# Patient Record
Sex: FEMALE | Race: OTHER | Employment: UNEMPLOYED | URBAN - METROPOLITAN AREA
[De-identification: names, ages, dates, MRNs, and addresses within clinical notes are randomized per-mention and may not be internally consistent; named-entity substitution may affect disease eponyms.]

---

## 2018-10-15 ENCOUNTER — HOSPITAL ENCOUNTER (OUTPATIENT)
Dept: ULTRASOUND IMAGING | Age: 38
Discharge: HOME | End: 2018-10-15
Attending: OBSTETRICS & GYNECOLOGY
Payer: COMMERCIAL

## 2018-10-15 DIAGNOSIS — D25.1 INTRAMURAL LEIOMYOMA OF UTERUS: ICD-10-CM

## 2018-10-15 PROCEDURE — 76856 US EXAM PELVIC COMPLETE: CPT | Performed by: OBSTETRICS & GYNECOLOGY

## 2018-10-15 PROCEDURE — 76830 TRANSVAGINAL US NON-OB: CPT | Performed by: OBSTETRICS & GYNECOLOGY

## 2018-10-22 NOTE — PROGRESS NOTES
Spoke with pt regarding US results. Pt aware of need for [facility] with probable polypectomy. Pt was told that [facility] will call to schedule procedure within next few days.

## 2018-11-28 ENCOUNTER — ANESTHESIA EVENT (OUTPATIENT)
Dept: SURGERY | Facility: HOSPITAL | Age: 38
End: 2018-11-28
Payer: COMMERCIAL

## 2018-11-29 ENCOUNTER — ANESTHESIA (OUTPATIENT)
Dept: SURGERY | Facility: HOSPITAL | Age: 38
End: 2018-11-29
Payer: COMMERCIAL

## 2018-11-29 ENCOUNTER — HOSPITAL ENCOUNTER (OUTPATIENT)
Facility: HOSPITAL | Age: 38
Setting detail: HOSPITAL OUTPATIENT SURGERY
Discharge: HOME | End: 2018-11-29
Attending: OBSTETRICS & GYNECOLOGY | Admitting: OBSTETRICS & GYNECOLOGY
Payer: COMMERCIAL

## 2018-11-29 VITALS
HEART RATE: 66 BPM | BODY MASS INDEX: 29.02 KG/M2 | HEIGHT: 65 IN | SYSTOLIC BLOOD PRESSURE: 106 MMHG | RESPIRATION RATE: 18 BRPM | DIASTOLIC BLOOD PRESSURE: 78 MMHG | TEMPERATURE: 98 F | WEIGHT: 174.19 LBS | OXYGEN SATURATION: 98 %

## 2018-11-29 DIAGNOSIS — N85.8 UTERINE MASS: ICD-10-CM

## 2018-11-29 PROCEDURE — 88305 TISSUE EXAM BY PATHOLOGIST: CPT | Performed by: OBSTETRICS & GYNECOLOGY

## 2018-11-29 PROCEDURE — 0UDB7ZZ EXTRACTION OF ENDOMETRIUM, VIA NATURAL OR ARTIFICIAL OPENING: ICD-10-PCS | Performed by: OBSTETRICS & GYNECOLOGY

## 2018-11-29 PROCEDURE — 0UB98ZZ EXCISION OF UTERUS, VIA NATURAL OR ARTIFICIAL OPENING ENDOSCOPIC: ICD-10-PCS | Performed by: OBSTETRICS & GYNECOLOGY

## 2018-11-29 RX ORDER — ACETAMINOPHEN 500 MG
500 TABLET ORAL EVERY 6 HOURS PRN
Status: ON HOLD | COMMUNITY
End: 2019-08-15

## 2018-11-29 RX ORDER — HYDROCODONE BITARTRATE AND ACETAMINOPHEN 5; 325 MG/1; MG/1
1 TABLET ORAL AS NEEDED
Status: DISCONTINUED | OUTPATIENT
Start: 2018-11-29 | End: 2018-11-29

## 2018-11-29 RX ORDER — METOCLOPRAMIDE HYDROCHLORIDE 5 MG/ML
10 INJECTION INTRAMUSCULAR; INTRAVENOUS AS NEEDED
Status: DISCONTINUED | OUTPATIENT
Start: 2018-11-29 | End: 2018-11-29

## 2018-11-29 RX ORDER — ONDANSETRON 4 MG/1
4 TABLET, FILM COATED ORAL EVERY 8 HOURS PRN
Status: DISCONTINUED | OUTPATIENT
Start: 2018-11-29 | End: 2018-11-29

## 2018-11-29 RX ORDER — ACETAMINOPHEN 500 MG
1000 TABLET ORAL ONCE
Status: DISCONTINUED | OUTPATIENT
Start: 2018-11-29 | End: 2018-11-29

## 2018-11-29 RX ORDER — SODIUM CHLORIDE, SODIUM LACTATE, POTASSIUM CHLORIDE, CALCIUM CHLORIDE 600; 310; 30; 20 MG/100ML; MG/100ML; MG/100ML; MG/100ML
INJECTION, SOLUTION INTRAVENOUS CONTINUOUS
Status: DISCONTINUED | OUTPATIENT
Start: 2018-11-29 | End: 2018-11-29

## 2018-11-29 RX ORDER — NALOXONE HYDROCHLORIDE 0.4 MG/ML
80 INJECTION, SOLUTION INTRAMUSCULAR; INTRAVENOUS; SUBCUTANEOUS AS NEEDED
Status: DISCONTINUED | OUTPATIENT
Start: 2018-11-29 | End: 2018-11-29

## 2018-11-29 RX ORDER — HYDROMORPHONE HYDROCHLORIDE 1 MG/ML
0.4 INJECTION, SOLUTION INTRAMUSCULAR; INTRAVENOUS; SUBCUTANEOUS EVERY 5 MIN PRN
Status: DISCONTINUED | OUTPATIENT
Start: 2018-11-29 | End: 2018-11-29

## 2018-11-29 RX ORDER — ONDANSETRON 2 MG/ML
4 INJECTION INTRAMUSCULAR; INTRAVENOUS EVERY 8 HOURS PRN
Status: DISCONTINUED | OUTPATIENT
Start: 2018-11-29 | End: 2018-11-29

## 2018-11-29 RX ORDER — HYDROCODONE BITARTRATE AND ACETAMINOPHEN 5; 325 MG/1; MG/1
2 TABLET ORAL AS NEEDED
Status: DISCONTINUED | OUTPATIENT
Start: 2018-11-29 | End: 2018-11-29

## 2018-11-29 RX ORDER — ONDANSETRON 2 MG/ML
4 INJECTION INTRAMUSCULAR; INTRAVENOUS AS NEEDED
Status: DISCONTINUED | OUTPATIENT
Start: 2018-11-29 | End: 2018-11-29

## 2018-11-29 NOTE — ANESTHESIA PREPROCEDURE EVALUATION
PRE-OP EVALUATION    Patient Name: Estevan Ortega    Pre-op Diagnosis: Uterine mass [N85.9]    Procedure(s): HYSTEROSCOPY POSSIBLE POLYPECTOMY OR MYOMECTOMY    Surgeon(s) and Role:     * Anais Newsome MD - Primary    Pre-op vitals reviewed.         B

## 2018-11-29 NOTE — ANESTHESIA POSTPROCEDURE EVALUATION
[facility] Status:  [facility]   Age/Gender 43year old female MRN [id number]   [facility] SURGERY Attending Zo Newsome MD   Hosp Day # 0 PCP No primary care provider on file.        Anes

## 2018-11-29 NOTE — OPERATIVE REPORT
Operative Note    Preop diagnosis: Menorrhagia      Anemia     Endometrial polyps  Postop diagnosis: Same  Procedure:  Hysteroscopy D&C polypectomy  Surgeon:  Milana Morillo  Anesthesia:  MAC  Findings:  HSC: multiple endometrial polyps, NL tubal ostia bilaterally

## 2019-05-14 PROBLEM — M23.52 OLD COMPLETE ACL TEAR, LEFT: Status: ACTIVE | Noted: 2019-05-14

## 2019-05-14 PROBLEM — S83.242A ACUTE MEDIAL MENISCUS TEAR OF LEFT KNEE: Status: ACTIVE | Noted: 2019-05-14

## 2019-05-14 PROBLEM — S83.282A ACUTE LATERAL MENISCUS TEAR OF LEFT KNEE: Status: ACTIVE | Noted: 2019-05-14

## 2019-05-14 PROBLEM — S83.422A TEAR OF LATERAL COLLATERAL LIGAMENT OF LEFT KNEE: Status: ACTIVE | Noted: 2019-05-14

## 2019-08-15 ENCOUNTER — APPOINTMENT (OUTPATIENT)
Dept: GENERAL RADIOLOGY | Facility: HOSPITAL | Age: 39
End: 2019-08-15
Attending: ORTHOPAEDIC SURGERY
Payer: COMMERCIAL

## 2019-08-15 ENCOUNTER — HOSPITAL ENCOUNTER (OUTPATIENT)
Facility: HOSPITAL | Age: 39
Discharge: HOME | End: 2019-08-16
Attending: ORTHOPAEDIC SURGERY | Admitting: ORTHOPAEDIC SURGERY
Payer: COMMERCIAL

## 2019-08-15 LAB
HBV SURFACE AG SER-ACNC: <0.1 [IU]/L
HBV SURFACE AG SERPL QL IA: NONREACTIVE
HCV AB SERPL QL IA: NONREACTIVE
POCT LOT NUMBER: NORMAL
POCT URINE PREGNANCY: NEGATIVE
RAPID HIV: NONREACTIVE

## 2019-08-15 PROCEDURE — 01NH0ZZ RELEASE PERONEAL NERVE, OPEN APPROACH: ICD-10-PCS | Performed by: ORTHOPAEDIC SURGERY

## 2019-08-15 PROCEDURE — 76000 FLUOROSCOPY <1 HR PHYS/QHP: CPT | Performed by: ORTHOPAEDIC SURGERY

## 2019-08-15 PROCEDURE — 81025 URINE PREGNANCY TEST: CPT | Performed by: ORTHOPAEDIC SURGERY

## 2019-08-15 PROCEDURE — 0MRN0KZ REPLACEMENT OF RIGHT KNEE BURSA AND LIGAMENT WITH NONAUTOLOGOUS TISSUE SUBSTITUTE, OPEN APPROACH: ICD-10-PCS | Performed by: ORTHOPAEDIC SURGERY

## 2019-08-15 PROCEDURE — 0SBC4ZZ EXCISION OF RIGHT KNEE JOINT, PERCUTANEOUS ENDOSCOPIC APPROACH: ICD-10-PCS | Performed by: ORTHOPAEDIC SURGERY

## 2019-08-15 PROCEDURE — 0LRQ0KZ REPLACEMENT OF RIGHT KNEE TENDON WITH NONAUTOLOGOUS TISSUE SUBSTITUTE, OPEN APPROACH: ICD-10-PCS | Performed by: ORTHOPAEDIC SURGERY

## 2019-08-15 PROCEDURE — 86701 HIV-1ANTIBODY: CPT | Performed by: ORTHOPAEDIC SURGERY

## 2019-08-15 PROCEDURE — 87340 HEPATITIS B SURFACE AG IA: CPT | Performed by: ORTHOPAEDIC SURGERY

## 2019-08-15 PROCEDURE — 86803 HEPATITIS C AB TEST: CPT | Performed by: ORTHOPAEDIC SURGERY

## 2019-08-15 RX ORDER — CEFAZOLIN SODIUM/WATER 2 G/20 ML
2 SYRINGE (ML) INTRAVENOUS ONCE
Status: COMPLETED | OUTPATIENT
Start: 2019-08-15 | End: 2019-08-15

## 2019-08-15 RX ORDER — ONDANSETRON 2 MG/ML
4 INJECTION INTRAMUSCULAR; INTRAVENOUS AS NEEDED
Status: DISCONTINUED | OUTPATIENT
Start: 2019-08-15 | End: 2019-08-15 | Stop reason: HOSPADM

## 2019-08-15 RX ORDER — SODIUM PHOSPHATE, DIBASIC AND SODIUM PHOSPHATE, MONOBASIC 7; 19 G/133ML; G/133ML
1 ENEMA RECTAL ONCE AS NEEDED
Status: DISCONTINUED | OUTPATIENT
Start: 2019-08-15 | End: 2019-08-16

## 2019-08-15 RX ORDER — HYDROMORPHONE HYDROCHLORIDE 1 MG/ML
0.4 INJECTION, SOLUTION INTRAMUSCULAR; INTRAVENOUS; SUBCUTANEOUS EVERY 5 MIN PRN
Status: DISCONTINUED | OUTPATIENT
Start: 2019-08-15 | End: 2019-08-15 | Stop reason: HOSPADM

## 2019-08-15 RX ORDER — DIPHENHYDRAMINE HYDROCHLORIDE 50 MG/ML
12.5 INJECTION INTRAMUSCULAR; INTRAVENOUS EVERY 4 HOURS PRN
Status: DISCONTINUED | OUTPATIENT
Start: 2019-08-15 | End: 2019-08-16

## 2019-08-15 RX ORDER — SODIUM CHLORIDE, SODIUM LACTATE, POTASSIUM CHLORIDE, CALCIUM CHLORIDE 600; 310; 30; 20 MG/100ML; MG/100ML; MG/100ML; MG/100ML
INJECTION, SOLUTION INTRAVENOUS CONTINUOUS
Status: DISCONTINUED | OUTPATIENT
Start: 2019-08-15 | End: 2019-08-16

## 2019-08-15 RX ORDER — ASPIRIN 325 MG
325 TABLET ORAL 2 TIMES DAILY
Status: DISCONTINUED | OUTPATIENT
Start: 2019-08-15 | End: 2019-08-16

## 2019-08-15 RX ORDER — OXYCODONE HYDROCHLORIDE 10 MG/1
10 TABLET ORAL EVERY 4 HOURS PRN
Status: DISCONTINUED | OUTPATIENT
Start: 2019-08-15 | End: 2019-08-16

## 2019-08-15 RX ORDER — OXYCODONE HCL 10 MG/1
10 TABLET, FILM COATED, EXTENDED RELEASE ORAL EVERY 12 HOURS
Qty: 10 TABLET | Refills: 0 | Status: SHIPPED | OUTPATIENT
Start: 2019-08-15 | End: 2019-09-24 | Stop reason: ALTCHOICE

## 2019-08-15 RX ORDER — ACETAMINOPHEN 325 MG/1
650 TABLET ORAL 4 TIMES DAILY
Status: DISCONTINUED | OUTPATIENT
Start: 2019-08-15 | End: 2019-08-16

## 2019-08-15 RX ORDER — SODIUM CHLORIDE, SODIUM LACTATE, POTASSIUM CHLORIDE, CALCIUM CHLORIDE 600; 310; 30; 20 MG/100ML; MG/100ML; MG/100ML; MG/100ML
INJECTION, SOLUTION INTRAVENOUS CONTINUOUS
Status: DISCONTINUED | OUTPATIENT
Start: 2019-08-15 | End: 2019-08-15 | Stop reason: HOSPADM

## 2019-08-15 RX ORDER — MIDAZOLAM HYDROCHLORIDE 1 MG/ML
1 INJECTION INTRAMUSCULAR; INTRAVENOUS EVERY 5 MIN PRN
Status: DISCONTINUED | OUTPATIENT
Start: 2019-08-15 | End: 2019-08-15 | Stop reason: HOSPADM

## 2019-08-15 RX ORDER — POLYETHYLENE GLYCOL 3350 17 G/17G
17 POWDER, FOR SOLUTION ORAL DAILY PRN
Status: DISCONTINUED | OUTPATIENT
Start: 2019-08-15 | End: 2019-08-16

## 2019-08-15 RX ORDER — HYDROMORPHONE HYDROCHLORIDE 1 MG/ML
0.8 INJECTION, SOLUTION INTRAMUSCULAR; INTRAVENOUS; SUBCUTANEOUS EVERY 2 HOUR PRN
Status: DISCONTINUED | OUTPATIENT
Start: 2019-08-15 | End: 2019-08-16

## 2019-08-15 RX ORDER — LABETALOL HYDROCHLORIDE 5 MG/ML
5 INJECTION, SOLUTION INTRAVENOUS EVERY 5 MIN PRN
Status: DISCONTINUED | OUTPATIENT
Start: 2019-08-15 | End: 2019-08-15 | Stop reason: HOSPADM

## 2019-08-15 RX ORDER — HYDROCODONE BITARTRATE AND ACETAMINOPHEN 5; 325 MG/1; MG/1
1 TABLET ORAL AS NEEDED
Status: DISCONTINUED | OUTPATIENT
Start: 2019-08-15 | End: 2019-08-15 | Stop reason: HOSPADM

## 2019-08-15 RX ORDER — SENNOSIDES 8.6 MG
17.2 TABLET ORAL NIGHTLY
Status: DISCONTINUED | OUTPATIENT
Start: 2019-08-15 | End: 2019-08-16

## 2019-08-15 RX ORDER — PROCHLORPERAZINE EDISYLATE 5 MG/ML
10 INJECTION INTRAMUSCULAR; INTRAVENOUS EVERY 6 HOURS PRN
Status: DISCONTINUED | OUTPATIENT
Start: 2019-08-15 | End: 2019-08-16

## 2019-08-15 RX ORDER — ACETAMINOPHEN 500 MG
1000 TABLET ORAL ONCE
Status: DISCONTINUED | OUTPATIENT
Start: 2019-08-15 | End: 2019-08-15 | Stop reason: HOSPADM

## 2019-08-15 RX ORDER — METOCLOPRAMIDE HYDROCHLORIDE 5 MG/ML
10 INJECTION INTRAMUSCULAR; INTRAVENOUS EVERY 6 HOURS PRN
Status: DISCONTINUED | OUTPATIENT
Start: 2019-08-15 | End: 2019-08-16

## 2019-08-15 RX ORDER — DIPHENHYDRAMINE HCL 25 MG
25 CAPSULE ORAL EVERY 4 HOURS PRN
Status: DISCONTINUED | OUTPATIENT
Start: 2019-08-15 | End: 2019-08-16

## 2019-08-15 RX ORDER — ONDANSETRON 2 MG/ML
4 INJECTION INTRAMUSCULAR; INTRAVENOUS EVERY 4 HOURS PRN
Status: DISCONTINUED | OUTPATIENT
Start: 2019-08-15 | End: 2019-08-16

## 2019-08-15 RX ORDER — HYDROCODONE BITARTRATE AND ACETAMINOPHEN 5; 325 MG/1; MG/1
2 TABLET ORAL AS NEEDED
Status: DISCONTINUED | OUTPATIENT
Start: 2019-08-15 | End: 2019-08-15 | Stop reason: HOSPADM

## 2019-08-15 RX ORDER — SCOLOPAMINE TRANSDERMAL SYSTEM 1 MG/1
1 PATCH, EXTENDED RELEASE TRANSDERMAL ONCE
Status: DISCONTINUED | OUTPATIENT
Start: 2019-08-15 | End: 2019-08-16

## 2019-08-15 RX ORDER — HYDROMORPHONE HYDROCHLORIDE 1 MG/ML
INJECTION, SOLUTION INTRAMUSCULAR; INTRAVENOUS; SUBCUTANEOUS
Status: COMPLETED
Start: 2019-08-15 | End: 2019-08-15

## 2019-08-15 RX ORDER — BISACODYL 10 MG
10 SUPPOSITORY, RECTAL RECTAL
Status: DISCONTINUED | OUTPATIENT
Start: 2019-08-15 | End: 2019-08-16

## 2019-08-15 RX ORDER — CEFAZOLIN SODIUM/WATER 2 G/20 ML
2 SYRINGE (ML) INTRAVENOUS EVERY 8 HOURS
Status: COMPLETED | OUTPATIENT
Start: 2019-08-16 | End: 2019-08-16

## 2019-08-15 RX ORDER — HYDROMORPHONE HYDROCHLORIDE 1 MG/ML
0.2 INJECTION, SOLUTION INTRAMUSCULAR; INTRAVENOUS; SUBCUTANEOUS EVERY 2 HOUR PRN
Status: DISCONTINUED | OUTPATIENT
Start: 2019-08-15 | End: 2019-08-16

## 2019-08-15 RX ORDER — MELATONIN
325
Status: DISCONTINUED | OUTPATIENT
Start: 2019-08-16 | End: 2019-08-16

## 2019-08-15 RX ORDER — DIPHENHYDRAMINE HYDROCHLORIDE 50 MG/ML
25 INJECTION INTRAMUSCULAR; INTRAVENOUS ONCE AS NEEDED
Status: ACTIVE | OUTPATIENT
Start: 2019-08-15 | End: 2019-08-15

## 2019-08-15 RX ORDER — NALOXONE HYDROCHLORIDE 0.4 MG/ML
80 INJECTION, SOLUTION INTRAMUSCULAR; INTRAVENOUS; SUBCUTANEOUS AS NEEDED
Status: DISCONTINUED | OUTPATIENT
Start: 2019-08-15 | End: 2019-08-15 | Stop reason: HOSPADM

## 2019-08-15 RX ORDER — DOCUSATE SODIUM 100 MG/1
100 CAPSULE, LIQUID FILLED ORAL 2 TIMES DAILY
Status: DISCONTINUED | OUTPATIENT
Start: 2019-08-15 | End: 2019-08-16

## 2019-08-15 RX ORDER — DIPHENHYDRAMINE HYDROCHLORIDE 50 MG/ML
12.5 INJECTION INTRAMUSCULAR; INTRAVENOUS AS NEEDED
Status: DISCONTINUED | OUTPATIENT
Start: 2019-08-15 | End: 2019-08-15 | Stop reason: HOSPADM

## 2019-08-15 RX ORDER — HYDROMORPHONE HYDROCHLORIDE 1 MG/ML
0.4 INJECTION, SOLUTION INTRAMUSCULAR; INTRAVENOUS; SUBCUTANEOUS EVERY 2 HOUR PRN
Status: DISCONTINUED | OUTPATIENT
Start: 2019-08-15 | End: 2019-08-16

## 2019-08-15 RX ORDER — OXYCODONE HYDROCHLORIDE 5 MG/1
5 TABLET ORAL EVERY 4 HOURS PRN
Status: DISCONTINUED | OUTPATIENT
Start: 2019-08-15 | End: 2019-08-16

## 2019-08-15 RX ORDER — MEPERIDINE HYDROCHLORIDE 25 MG/ML
12.5 INJECTION INTRAMUSCULAR; INTRAVENOUS; SUBCUTANEOUS AS NEEDED
Status: DISCONTINUED | OUTPATIENT
Start: 2019-08-15 | End: 2019-08-15 | Stop reason: HOSPADM

## 2019-08-15 RX ORDER — HYDROCODONE BITARTRATE AND ACETAMINOPHEN 10; 325 MG/1; MG/1
1 TABLET ORAL EVERY 4 HOURS PRN
Qty: 40 TABLET | Refills: 0 | Status: SHIPPED | OUTPATIENT
Start: 2019-08-15 | End: 2019-08-20

## 2019-08-15 RX ORDER — OXYCODONE HYDROCHLORIDE 15 MG/1
15 TABLET ORAL EVERY 4 HOURS PRN
Status: DISCONTINUED | OUTPATIENT
Start: 2019-08-15 | End: 2019-08-16

## 2019-08-15 NOTE — H&P
History & Physical Examination    Patient Name: Kiersten Stanton  MRN: [id number]  CSN: [id number]  YOB: 1980    Diagnosis: Right knee ACL tear and posterolateral corner injury    Present Illness: Ms. Heena Green is a 34-year-old female here for s chondral injuries are noted within the knee.     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   Her HPI.        PHYSICAL EXAM:  General: Martha Hussein is well-developed, alert and oriented x3, and in no acute distress, with a normal affect. Head: Normocephalic, atraumatic. Eyes: Pupils are equal and reactive to light. Extraocular muscles are intact.    Ches arthroscopy, partial medial and lateral meniscectomy, ACL reconstruction and posterolateral corner reconstruction with allograft. This would likely be done in conjunction with Dr. Adam Dover.   The patient is agreeable with the treatment plan and all question have discussed the risks and benefits and alternatives with the patient/family. They understand and agree to proceed with plan of care. [ x ] I have reviewed the History and Physical done within the last 30 days. Any changes noted above.     Asha Camilo

## 2019-08-16 VITALS
DIASTOLIC BLOOD PRESSURE: 65 MMHG | TEMPERATURE: 98 F | RESPIRATION RATE: 16 BRPM | OXYGEN SATURATION: 98 % | SYSTOLIC BLOOD PRESSURE: 97 MMHG | BODY MASS INDEX: 34.88 KG/M2 | HEIGHT: 61 IN | HEART RATE: 57 BPM | WEIGHT: 184.75 LBS

## 2019-08-16 PROCEDURE — 97165 OT EVAL LOW COMPLEX 30 MIN: CPT

## 2019-08-16 PROCEDURE — 97161 PT EVAL LOW COMPLEX 20 MIN: CPT

## 2019-08-16 PROCEDURE — 97535 SELF CARE MNGMENT TRAINING: CPT

## 2019-08-16 PROCEDURE — 97116 GAIT TRAINING THERAPY: CPT

## 2019-08-16 RX ORDER — HYDROCODONE BITARTRATE AND ACETAMINOPHEN 10; 325 MG/1; MG/1
1 TABLET ORAL EVERY 4 HOURS PRN
Status: DISCONTINUED | OUTPATIENT
Start: 2019-08-16 | End: 2019-08-16

## 2019-08-16 RX ORDER — HYDROCODONE BITARTRATE AND ACETAMINOPHEN 10; 325 MG/1; MG/1
2 TABLET ORAL EVERY 4 HOURS PRN
Status: DISCONTINUED | OUTPATIENT
Start: 2019-08-16 | End: 2019-08-16

## 2019-08-16 RX ORDER — ASPIRIN 325 MG
325 TABLET ORAL DAILY
Qty: 42 TABLET | Refills: 0 | Status: SHIPPED | OUTPATIENT
Start: 2019-08-16 | End: 2019-09-27

## 2019-08-16 RX ORDER — ASPIRIN 325 MG
325 TABLET ORAL 2 TIMES DAILY
Qty: 42 TABLET | Refills: 0 | Status: SHIPPED | OUTPATIENT
Start: 2019-08-16 | End: 2019-08-16

## 2019-08-16 NOTE — PLAN OF CARE
Pt A & O x3, Filipino speaking,  at bedside. L scop patch. Denies numbness/tingling. Regular diet. TTWB. Up  x sba/walker. TROM brace. Ace wrap CDI. OXYIR PRN pain. Miralax given this AM. ASA 325mg BID. PT/OT.  Dr. Vamshi Briggs called pt's  this AM

## 2019-08-16 NOTE — PROGRESS NOTES
She says she feels well.   Pain controlled on [place].    Patient Vitals for the past 24 hrs:   BP Temp Temp src Pulse Resp SpO2   08/16/19 1554 97/65 98.4 °F (36.9 °C) Oral 57 16 —   08/16/19 1156 104/61 98.3 °F (36.8 °C) Oral 67 16 98 %   08/16/19 0759 99/6

## 2019-08-16 NOTE — PHYSICAL THERAPY NOTE
PHYSICAL THERAPY QUICK EVALUATION - INPATIENT    Room Number: 359/359-A  Evaluation Date: 8/16/2019  Presenting Problem: s/p right ACL repair, partial menisectomy,neurolysis of peroneal nerve  Physician Order: PT Eval and Treat    Problem List  Active Pr AM-PAC '6-Clicks' INPATIENT SHORT FORM - BASIC MOBILITY  How much difficulty does the patient currently have. ..  -   Turning over in bed (including adjusting bedclothes, sheets and blankets)?: None   -   Sitting down on and standing up from a chair w education provided; All patient questions and concerns addressed;SCDs in place; Family present    ASSESSMENT   Patient is a 43year old female admitted on 8/15/2019 for right ACL repair, partial lateral menisectomy, neurolysis of peroneal nerve.   Pertinent c

## 2019-08-16 NOTE — OPERATIVE REPORT
[facility]    PATIENT'S NAME: Temi Matute PHYSICIAN: Greta Aguirre M.D. OPERATING PHYSICIAN: Greta Aguirre M.D.    PATIENT ACCOUNT#:   [de-identified]    LOCATION:  [address] [place] [address]  MEDICAL RECORD #:   [id number]       DATE OF BIRTH: Dr. Tim Dye discussed everything with him as well. I personally discussed that there is a risk of nerve damage, particularly the peroneal nerve as well as the popliteal nerve and vessels with this particular surgery.   Even if the peroneal nerve is not injure laxity at both 0 and 30. She had gross lateral laxity at 0 and 30 on the right knee. She had a negative posterior drawer on both sides. A well-padded tourniquet was placed over the proximal right thigh.   The right lower extremity was then prepped and allow her to flex due to her size. The 7 mm femoral offset guide was placed at the previously marked site. A Beath needle was placed and good position confirmed. This was then overreamed with a 9.5 mm low-profile Arthrex reamer to a depth of 30 mm.     Consuelo Gastelum biceps femoris was a small nerve, which actually seemed to enervate the skin area. This did not appear to be the peroneal nerve. Dissecting a little bit deeper, the common peroneal nerve was identified.   This was carefully dissected out from proximal to well as good radiographic confirmation, the guidewire was placed. I then held the retractor and my finger posteriorly confirming that the guidewire barely penetrated the posterior cortex.   The retractor was placed as well as a popliteal retractor to prote pulling on the graft. The graft was then seated to about 25 mm. In the same fashion, the popliteus graft was inserted and under x-ray visualization, confirmed engagement of the TightRope button on the medial cortex.   This graft was then seated to about 2 distal tension on the ACL graft. The tibial tunnel was fixed with a 9 mm Arthrex BioComposite interference screw. This had excellent seating purchase and no need for backup fixation was deemed to be necessary. The excess graft was then trimmed.   The pa days' time for wound evaluation. She is to be toe-touch weightbearing only on that side.     Dictated By Ivan Cabrera M.D.  d: 08/15/2019 20:24:35  t: 08/15/2019 83:85:44  Maria Ines Young [id number]  FP/

## 2019-08-16 NOTE — PROGRESS NOTES
Pt discharged via w/c to [facility]. Pt has all belongings. INformed pt's  that she is to take ASA 325mg daily x 6 weeks and call his office Monday first thing in AM to make appt to see Dr. Thierry Bolton on Monday or Tuesday.

## 2019-08-16 NOTE — PLAN OF CARE
Pt received from PACU, [facility]. Faroese speaking only. T-rom brace noted on the RLE. Denies numbness/tingling. Acewrap dressing C/D/I. Medicated with IV and oral pain meds PRN. Denies nausea/vomiting. Educated about TTWB, IS use. Plan is for PT/OT.  Instructed

## 2019-08-16 NOTE — CM/SW NOTE
08/16/19 1400   CM/SW Screening   Referral Source Social Work (self-referral)   Information Source Chart review;Nursing rounds   Patient's Mental Status Alert;Oriented   Patient lives with Spouse       HOME SITUATION  Type of Home: [place]

## 2019-11-22 PROBLEM — S72.413A: Status: ACTIVE | Noted: 2019-11-22

## 2021-10-30 ENCOUNTER — HOSPITAL ENCOUNTER (OUTPATIENT)
Dept: GENERAL RADIOLOGY | Facility: HOSPITAL | Age: 41
Discharge: HOME | End: 2021-10-30
Attending: NURSE PRACTITIONER
Payer: COMMERCIAL

## 2021-10-30 DIAGNOSIS — M25.40 SWELLING OF JOINT: ICD-10-CM

## 2021-10-30 DIAGNOSIS — M25.561 RIGHT KNEE PAIN: ICD-10-CM

## 2021-10-30 PROCEDURE — 73564 X-RAY EXAM KNEE 4 OR MORE: CPT

## 2023-08-05 ENCOUNTER — HOSPITAL ENCOUNTER (OUTPATIENT)
Dept: GENERAL RADIOLOGY | Facility: HOSPITAL | Age: 43
Discharge: HOME | End: 2023-08-05
Payer: MEDICAID

## 2023-08-05 DIAGNOSIS — G89.29 CHRONIC PAIN OF RIGHT KNEE: ICD-10-CM

## 2023-08-05 DIAGNOSIS — M25.561 CHRONIC PAIN OF RIGHT KNEE: ICD-10-CM

## 2023-08-05 PROCEDURE — 73564 X-RAY EXAM KNEE 4 OR MORE: CPT

## 2023-08-09 NOTE — OCCUPATIONAL THERAPY NOTE
"[facility] OUTPATIENT REHABILITATION  Physical Therapy Treatment Note    Name: Casi Broussard  Clinic Number: [id number]    Therapy Diagnosis:   Encounter Diagnoses   Name Primary?    Left foot pain Yes    Left ankle pain, unspecified chronicity      Physician: Star Galaviz IV, DO    Visit Date: 8/9/2023    Physician Orders: PT Eval and Treat  Medical Diagnosis from Referral: M79.672 (ICD-10-CM) - Left foot pain and M25.572 (ICD-10-CM) - Left ankle pain, unspecified chronicity  Evaluation Date: 7/19/2023  Authorization Period Expiration: 7/9/2024  Plan of Care Expiration: 8/25/2023  Visit # / Visits authorized: 6/9 (including initial evaluation)  PTA Visit #: 2    Time In: 1315 (patient's treatment started before patient was checked in)   Time Out: 1400  Total Billable Time: 45 minutes    Precautions: Standard    Subjective     Pt reports: "I feel my pain mostly if I move it in a certain way."     She was compliant with home exercise program.  Response to previous treatment: first visit since initial evaluation    Pain: 3/10  Location: dorsal surface of left foot     Objective     Casi received therapeutic exercises to develop strength, endurance, ROM, and flexibility for 25 minutes including:  NuStep: x 5 minutes  Calf stretch on slant board: x 2 minutes  Towel scrunches: x 30 reps  Resisted plantar flexion/dorsiflexion/inversion/eversion (left): red theraband; x 20 reps each direction (verbal/tactile cues for proper completion of inversion)    Casi received the following manual therapy techniques: were applied to the: left foot for 0 minutes, including:  Manual stretching of the left ankle/foot into dorsiflexion, plantarflexion, inversion, and eversion (not performed)   Joint mobilizations of the tarsometatarsal and metatarsophalangeal joints (not performed)     Casi participated in neuromuscular re-education activities to improve: Balance for 10 minutes. The following activities were " OCCUPATIONAL THERAPY QUICK EVALUATION - INPATIENT    Room Number: 359/359-A  Evaluation Date: 8/16/2019     Type of Evaluation: Quick Eval  Presenting Problem: (R knee reconstruction with neurolysis of peroneal nerve)    Physician Order: IP Consult to [employer] included:  Kincaid  (left): x 4 minutes   Heel/toe raises: x 30 reps with least required upper extremity support    Casi participated in gait training to improve functional mobility and safety for 0 minutes, including:  (not performed)    Casi received cold pack for 10 minutes to the left foot elevated on a wedge.    Home Exercises Provided and Patient Education Provided     Education provided: Patient educated on the importance of completing skilled physical therapy treatment and home exercise program as prescribed to maximize functional gains.    Written Home Exercises Provided: Patient instructed to cont prior HEP. Exercises were reviewed and Casi was able to demonstrate them prior to the end of the session.  Casi demonstrated fair  understanding of the education provided.     See EMR under Patient Instructions for exercises provided  during therapy sessions .    Assessment     Patient with good effort and with no new complaints following treatment.     Casi isprogressing well towards her goals.   Pt prognosis is Good.     Pt will continue to benefit from skilled outpatient physical therapy to address the deficits listed in the problem list box on initial evaluation, provide pt/family education, and to maximize pt's level of independence in the home and community environment.     Pt's spiritual, cultural, and educational needs considered and pt agreeable to plan of care and goals.     Anticipated barriers to physical therapy: compliance with hep    Goals:    Short Term Goals:  Patient will demonstrate independence with home exercise program to ensure carryover of treatment.  Patient will improve left ankle plantarflexion active range of motion to 55 degrees to improve functional use of the left lower extremity.  Patient will improve left lower extremity strength to 4+/5 to improve independence and safety with bed mobility, transfers, and gait.  Patient will ambulate 150 feet without assistive  independent    OBJECTIVE  Precautions:  needed(knee brace locked in extension)  Fall Risk: Standard fall risk    WEIGHT BEARING RESTRICTION  Weight Bearing Restriction: R lower extremity        R Lower Extremity: Toe Touch Weight Bearing       P device with independence with equal stance time and bilateral heel strike to improve independence and safety with gait.   Patient will report a reduction in left foot pain from 8/10 to 6/10 at worst to improve quality of life.      Long Term Goals:  Patient will improve left lower extremity strength to 5/5 to improve independence and safety with bed mobility, transfers, and gait.  Patient will ambulate 300 feet without assistive device with independence with normal gait mechanics including up/down curbs and ramps to improve independence and safety with community ambulation.  Patient will report a reduction in left foot pain from 8/10 to 4/10 at worst to improve quality of life.     Plan     Patient will continue to benefit from skilled physical therapy treatment as prescribed working towards goals listed above to maximize functional potential.       MARY CARMEN Zimmerman  8/9/2023     except tight leggings. Patient stood with supervision; used RW for [facility] distance approx 75 feet with initial cues form PT ( see PT eval) for TTWB; patient able to hop as in NWB initially, then able to perform TTWB.   OT educated patient on safe sequencing an performance deficits    Client Assessment/Performance Deficits  MODERATE - Comorbidities and min to mod modifications of tasks    Clinical Decision Making  LOW - Analysis of occupational profile, problem-focused assessments, limited treatment options    Ov

## 2024-05-03 ENCOUNTER — HOSPITAL ENCOUNTER (OUTPATIENT)
Age: 44
Discharge: HOME | End: 2024-05-03
Payer: MEDICAID

## 2024-05-03 ENCOUNTER — APPOINTMENT (OUTPATIENT)
Dept: GENERAL RADIOLOGY | Age: 44
End: 2024-05-03
Payer: MEDICAID

## 2024-05-03 VITALS
DIASTOLIC BLOOD PRESSURE: 81 MMHG | TEMPERATURE: 97 F | HEART RATE: 73 BPM | RESPIRATION RATE: 18 BRPM | SYSTOLIC BLOOD PRESSURE: 127 MMHG | OXYGEN SATURATION: 97 %

## 2024-05-03 DIAGNOSIS — M25.561 ACUTE PAIN OF RIGHT KNEE: ICD-10-CM

## 2024-05-03 DIAGNOSIS — S83.411A SPRAIN OF MEDIAL COLLATERAL LIGAMENT OF RIGHT KNEE, INITIAL ENCOUNTER: Primary | ICD-10-CM

## 2024-05-03 PROCEDURE — A6449 LT COMPRES BAND >=3" <5"/YD: HCPCS

## 2024-05-03 PROCEDURE — 73560 X-RAY EXAM OF KNEE 1 OR 2: CPT

## 2024-05-03 PROCEDURE — 99213 OFFICE O/P EST LOW 20 MIN: CPT

## 2024-05-03 RX ORDER — IBUPROFEN 600 MG/1
600 TABLET ORAL EVERY 8 HOURS PRN
Qty: 30 TABLET | Refills: 0 | Status: SHIPPED | OUTPATIENT
Start: 2024-05-03 | End: 2024-05-10

## 2024-05-03 NOTE — DISCHARGE INSTRUCTIONS
There is no fracture on your x-ray.  You likely have a sprain of your knee.  Wear the wrap or knee sleeve for comfort.  Rest, ice and elevate.  I sent a prescription for ibuprofen for pain.  Take Tylenol in between doses as needed.  If you develop any numbness, tingling, acutely worsening pain, swelling or any other concerning complaints you should go to the emergency department.  If you have persistent pain for more than the next week make an appointment to see the orthopedic specialist.  Otherwise follow-up with your primary care doctor.

## 2024-05-03 NOTE — ED PROVIDER NOTES
Patient Seen in: [facility]      History     Chief Complaint   Patient presents with    Knee Pain     Stated Complaint: Knee Pain    Subjective:   Ifrah is a 43-year-old female presenting to the immediate care complaining of right knee pain.  Patient states that 2 weeks ago she had a mechanical fall landing on her right knee.  Denies hitting her head or having loss of consciousness.  Patient has no head, neck or back pain.  Patient states that intermittently she will have some mild tingling to her toes; no persistent weakness, numbness, tingling.  She has had persistent pain to the right knee ever since.  Patient works in a factory and stands all day.  States that the pain is worse after standing all day long.  Patient states she is otherwise feeling well.  She denies any other concerns or complaints.          Objective:   History reviewed. No pertinent past medical history.           Past Surgical History:   Procedure Laterality Date    Other Right     ligament surgery on knee    Tubal ligation  2005                Social History     Socioeconomic History    Marital status: Unknown   Tobacco Use    Smoking status: Never    Smokeless tobacco: Never   Vaping Use    Vaping status: Never Used   Substance and Sexual Activity    Alcohol use: Yes     Comment: socially     Drug use: No    Sexual activity: Yes     Partners: Male     Birth control/protection: Tubal Ligation     Social Determinants of Health     Financial Resource Strain: Not on File (10/6/2022)    Received from [facility]     Financial Resource Strain     Financial Resource Strain: 0   Food Insecurity: Not on File (10/6/2022)    Received from [facility]     Food Insecurity     Food: 0   Transportation Needs: Not on File (10/6/2022)    Received from [facility]     Transportation Needs     Transportation: 0   Physical Activity: Not on File (10/6/2022)    Received from [facility]     Physical Activity     Physical Activity: 0    Stress: Not on File (10/6/2022)    Received from [facility]     Stress     Stress: 0   Social Connections: Not on File (10/6/2022)    Received from [facility]     Social Connections     Social Connections and Isolation: 0   Housing Stability: Not on File (10/6/2022)    Received from [facility]     Housing Stability     Housin              Review of Systems    Positive for stated complaint: Knee Pain  Other systems are as noted in HPI.  Constitutional and vital signs reviewed.      All other systems reviewed and negative except as noted above.    Physical Exam     ED Triage Vitals [24 1651]   /81   Pulse 73   Resp 18   Temp 97 °F (36.1 °C)   Temp src Temporal   SpO2 97 %   O2 Device None (Room air)       Current:/81   Pulse 73   Temp 97 °F (36.1 °C) (Temporal)   Resp 18   SpO2 97%         Physical Exam  Vitals and nursing note reviewed.   Constitutional:       General: She is not in acute distress.     Appearance: Normal appearance. She is not ill-appearing, toxic-appearing or diaphoretic.   HENT:      Head: Normocephalic.   Cardiovascular:      Rate and Rhythm: Normal rate.   Pulmonary:      Effort: Pulmonary effort is normal.   Musculoskeletal:         General: Normal range of motion.      Cervical back: Normal range of motion.      Right knee: No swelling, deformity, effusion, erythema, ecchymosis, lacerations, bony tenderness or crepitus. Normal range of motion. Tenderness present over the medial joint line. Normal pulse.      Instability Tests: Medial Barrington test positive. Lateral Barrington test negative.      Left knee: Normal.      Comments: Positive CMS.  2+ DP and PT pulses.  Capillary refill less than 2 seconds.  Patient has full range of motion to the entire right knee.  Full range of motion to the entire right lower extremity.  The right lower leg, thigh, calf, hip, ankle and foot are unremarkable.  Patient has no calf pain, tenderness, swelling, erythema or warmth.  No abrasions or lacerations noted.  No ecchymosis noted.  No obvious deformity noted.  No drainage or discharge noted.  Patient has mild anterior knee tenderness with palpation. Positive medial Barrington's test.     Skin:     General: Skin is warm and dry.      Capillary Refill: Capillary refill takes less than 2 seconds.   Neurological:      General: No focal deficit present.      Mental Status: She is alert and oriented to person, place, and time.   Psychiatric:         Mood and Affect: Mood normal.         Behavior: Behavior normal.         Thought Content: Thought content normal.         Judgment: Judgment normal.              ED Course   Labs Reviewed - No data to display  XR KNEE (1 OR 2 VIEWS), RIGHT (CPT=73560)    Result Date: 5/3/2024  CONCLUSION:  1. No acute appearing fracture or dislocation.  Mild to moderate narrowing of the medial joint space.  Postsurgical changes noted.    Dictated by (CST): Levi Sánchez MD on 5/03/2024 at 5:11 PM     Finalized by (CST): Levi Sánchez MD on 5/03/2024 at 5:13 PM                MDM            Medical Decision Making  Multiple medical diagnoses were considered including but not limited to versus soft tissue pain to the right knee.  Patient is well appearing, non-toxic and in no acute distress.  Vital signs are stable.   There is no fracture on x-ray per the radiology read.  I independently reviewed the films, there are no obvious signs of fracture.  Patient's history and physical exam are consistent with a sprain.  Ace wrap placed for comfort.    I recommended close follow-up with orthopedic surgery given the patient's history of ligament repair in the same knee.  I did recommend a seated job or light duty but the patient states she cannot do that at her work.  Recommended RICE.  Recommended using Tylenol and Motrin for pain.  Recommended that if the patient develop any numbness, tingling, acutely worsening pain, swelling or any other concerns or complaints they go to  the emergency department.  Recommended that if patient has persistent pain they make an appointment to follow-up with the orthopedic specialist.  Otherwise recommended follow-up with primary care doctor.  ED precautions discussed.  Patient (guardian) advised to follow up with PCP in 2-3 days.  Patient (guardian) agrees with this plan of care.  Patient (guardian) verbalizes understanding of discharge instructions and plan of care.      Amount and/or Complexity of Data Reviewed  Radiology: ordered and independent interpretation performed. Decision-making details documented in ED Course.    Risk  OTC drugs.  Prescription drug management.        Disposition and Plan     Clinical Impression:  1. Sprain of medial collateral ligament of right knee, initial encounter    2. Acute pain of right knee         Disposition:  Discharge  5/3/2024  5:30 pm    Follow-up:  Berkley Matthew MD  [address] [place] [id number]  [phone]          Yanick Hanks MD  [address] [address]  [phone]          Daniel Osei MD  [facility] [address] [place] [address]  [phone]                Medications Prescribed:  Discharge Medication List as of 5/3/2024  5:32 PM        START taking these medications    Details   ibuprofen 600 MG Oral Tab Take 1 tablet (600 mg total) by mouth every 8 (eight) hours as needed for Pain or Fever., Normal, Disp-30 tablet, R-0

## 2024-06-13 ENCOUNTER — TELEPHONE (OUTPATIENT)
Dept: ORTHOPEDICS CLINIC | Facility: CLINIC | Age: 44
End: 2024-06-13

## 2024-06-13 NOTE — TELEPHONE ENCOUNTER
Patient is scheduled for right knee pain. X-rays in Epic. Please advise if additional imaging is needed.  Future Appointments   Date Time Provider Department Center   6/24/2024  2:20 PM [place] [id number]

## 2024-06-28 ENCOUNTER — OFFICE VISIT (OUTPATIENT)
Dept: ORTHOPEDICS CLINIC | Facility: CLINIC | Age: 44
End: 2024-06-28

## 2024-06-28 VITALS — WEIGHT: 182 LBS | HEIGHT: 61 IN | BODY MASS INDEX: 34.36 KG/M2

## 2024-06-28 DIAGNOSIS — M25.561 CHRONIC PAIN OF RIGHT KNEE: Primary | ICD-10-CM

## 2024-06-28 DIAGNOSIS — G89.29 CHRONIC PAIN OF RIGHT KNEE: Primary | ICD-10-CM

## 2024-06-28 PROCEDURE — 99214 OFFICE O/P EST MOD 30 MIN: CPT | Performed by: STUDENT IN AN ORGANIZED HEALTH CARE EDUCATION/TRAINING PROGRAM

## 2024-06-28 RX ORDER — MELOXICAM 7.5 MG/1
7.5 TABLET ORAL DAILY
COMMUNITY
Start: 2024-05-31

## 2024-06-29 ENCOUNTER — LAB ENCOUNTER (OUTPATIENT)
Dept: LAB | Facility: HOSPITAL | Age: 44
End: 2024-06-29
Attending: INTERNAL MEDICINE
Payer: MEDICAID

## 2024-06-29 DIAGNOSIS — Z00.00 ROUTINE GENERAL MEDICAL EXAMINATION AT A HEALTH CARE FACILITY: Primary | ICD-10-CM

## 2024-06-29 LAB
ALBUMIN SERPL-MCNC: 4.7 G/DL (ref 3.2–4.8)
ALBUMIN/GLOB SERPL: 1.7 {RATIO} (ref 1–2)
ALP LIVER SERPL-CCNC: 92 U/L
ALT SERPL-CCNC: 32 U/L
ANION GAP SERPL CALC-SCNC: 5 MMOL/L (ref 0–18)
AST SERPL-CCNC: 35 U/L (ref ?–34)
BASOPHILS # BLD AUTO: 0.05 X10(3) UL (ref 0–0.2)
BASOPHILS NFR BLD AUTO: 0.7 %
BILIRUB SERPL-MCNC: 0.7 MG/DL (ref 0.3–1.2)
BILIRUB UR QL: NEGATIVE
BUN BLD-MCNC: 8 MG/DL (ref 9–23)
BUN/CREAT SERPL: 13.3 (ref 10–20)
CALCIUM BLD-MCNC: 9 MG/DL (ref 8.7–10.4)
CHLORIDE SERPL-SCNC: 110 MMOL/L (ref 98–112)
CHOLEST SERPL-MCNC: 260 MG/DL (ref ?–200)
CLARITY UR: CLEAR
CO2 SERPL-SCNC: 24 MMOL/L (ref 21–32)
CREAT BLD-MCNC: 0.6 MG/DL
DEPRECATED RDW RBC AUTO: 44.4 FL (ref 35.1–46.3)
EGFRCR SERPLBLD CKD-EPI 2021: 113 ML/MIN/1.73M2 (ref 60–?)
EOSINOPHIL # BLD AUTO: 0.09 X10(3) UL (ref 0–0.7)
EOSINOPHIL NFR BLD AUTO: 1.3 %
ERYTHROCYTE [DISTWIDTH] IN BLOOD BY AUTOMATED COUNT: 13.3 % (ref 11–15)
FASTING PATIENT LIPID ANSWER: YES
FASTING STATUS PATIENT QL REPORTED: YES
GLOBULIN PLAS-MCNC: 2.7 G/DL (ref 2–3.5)
GLUCOSE BLD-MCNC: 93 MG/DL (ref 70–99)
GLUCOSE UR-MCNC: NORMAL MG/DL
HCT VFR BLD AUTO: 40.8 %
HDLC SERPL-MCNC: 39 MG/DL (ref 40–59)
HGB BLD-MCNC: 13.2 G/DL
IMM GRANULOCYTES # BLD AUTO: 0.02 X10(3) UL (ref 0–1)
IMM GRANULOCYTES NFR BLD: 0.3 %
KETONES UR-MCNC: NEGATIVE MG/DL
LDLC SERPL CALC-MCNC: 155 MG/DL (ref ?–100)
LEUKOCYTE ESTERASE UR QL STRIP.AUTO: NEGATIVE
LYMPHOCYTES # BLD AUTO: 1.9 X10(3) UL (ref 1–4)
LYMPHOCYTES NFR BLD AUTO: 27.8 %
MCH RBC QN AUTO: 29.3 PG (ref 26–34)
MCHC RBC AUTO-ENTMCNC: 32.4 G/DL (ref 31–37)
MCV RBC AUTO: 90.7 FL
MONOCYTES # BLD AUTO: 0.35 X10(3) UL (ref 0.1–1)
MONOCYTES NFR BLD AUTO: 5.1 %
NEUTROPHILS # BLD AUTO: 4.43 X10 (3) UL (ref 1.5–7.7)
NEUTROPHILS # BLD AUTO: 4.43 X10(3) UL (ref 1.5–7.7)
NEUTROPHILS NFR BLD AUTO: 64.8 %
NITRITE UR QL STRIP.AUTO: NEGATIVE
NONHDLC SERPL-MCNC: 221 MG/DL (ref ?–130)
OSMOLALITY SERPL CALC.SUM OF ELEC: 286 MOSM/KG (ref 275–295)
PH UR: 7 [PH] (ref 5–8)
PLATELET # BLD AUTO: 372 10(3)UL (ref 150–450)
POTASSIUM SERPL-SCNC: 3.6 MMOL/L (ref 3.5–5.1)
PROT SERPL-MCNC: 7.4 G/DL (ref 5.7–8.2)
PROT UR-MCNC: NEGATIVE MG/DL
RBC # BLD AUTO: 4.5 X10(6)UL
SODIUM SERPL-SCNC: 139 MMOL/L (ref 136–145)
SP GR UR STRIP: 1.01 (ref 1–1.03)
T4 FREE SERPL-MCNC: 1 NG/DL (ref 0.8–1.7)
TRIGL SERPL-MCNC: 350 MG/DL (ref 30–149)
TSI SER-ACNC: 3.35 MIU/ML (ref 0.55–4.78)
UROBILINOGEN UR STRIP-ACNC: NORMAL
VLDLC SERPL CALC-MCNC: 69 MG/DL (ref 0–30)
WBC # BLD AUTO: 6.8 X10(3) UL (ref 4–11)

## 2024-06-29 PROCEDURE — 80053 COMPREHEN METABOLIC PANEL: CPT

## 2024-06-29 PROCEDURE — 80061 LIPID PANEL: CPT

## 2024-06-29 PROCEDURE — 84439 ASSAY OF FREE THYROXINE: CPT

## 2024-06-29 PROCEDURE — 85025 COMPLETE CBC W/AUTO DIFF WBC: CPT

## 2024-06-29 PROCEDURE — 36415 COLL VENOUS BLD VENIPUNCTURE: CPT

## 2024-06-29 PROCEDURE — 84443 ASSAY THYROID STIM HORMONE: CPT

## 2024-06-29 PROCEDURE — 81001 URINALYSIS AUTO W/SCOPE: CPT

## 2024-06-30 PROBLEM — M25.561 CHRONIC PAIN OF RIGHT KNEE: Status: ACTIVE | Noted: 2024-06-30

## 2024-06-30 PROBLEM — G89.29 CHRONIC PAIN OF RIGHT KNEE: Status: ACTIVE | Noted: 2024-06-30

## 2024-06-30 NOTE — PROGRESS NOTES
Orthopaedic Surgery New Patient Visit  _____________________________________________________________________________________________________  _____________________________________________________________________________________________________    DATE OF VISIT: 6/28/2024     CHIEF COMPLAINT:   Chief Complaint   Patient presents with    Knee Pain     Consult right knee pain 7/10. On April 2024 she fell down  in her house. Has XR in Epic. Language line used,  Chantal ID #[id number].        HISTORY OF PRESENT ILLNESS: Ifrah Meza is a 43 year old female who presents to the clinic for evaluation of her right knee.  She reports that in April of this year she fell down in her house, resulting in injury to the right knee.  She reports since that time she has had occasional buckling and instability in the right knee.  She has a history of several injuries to the right knee including lateral ligamentous reconstruction and anterior cruciate ligament reconstruction for total of 7 operations.  She reports that her pain at this time is approximately 7 out of 10.  It is made worse with attempted ambulation and increases in activity and improved with rest and anti-inflammatories, which overall to a fair job of managing her pain.  She denies any neurologic symptoms.  She reports that prior to her knee injury, she had baseline pain in the knee and this is only worsened since the injury.  She reports intermittent mechanical symptoms.    SOCIAL HISTORY  Social History     Socioeconomic History    Marital status: Unknown     Spouse name: Not on file    Number of children: Not on file    Years of education: Not on file    Highest education level: Not on file   Occupational History    Not on file   Tobacco Use    Smoking status: Never    Smokeless tobacco: Never   Vaping Use    Vaping status: Never Used   Substance and Sexual Activity    Alcohol use: Yes     Comment: socially     Drug use: No    Sexual activity: Yes      Partners: Male     Birth control/protection: Tubal Ligation   Other Topics Concern    Not on file   Social History Narrative    Not on file     Social Determinants of Health     Financial Resource Strain: Not on File (10/6/2022)    Received from [facility]     Financial Resource Strain     Financial Resource Strain: 0   Food Insecurity: Not on File (10/6/2022)    Received from [facility]     Food Insecurity     Food: 0   Transportation Needs: Not on File (10/6/2022)    Received from [facility]     Transportation Needs     Transportation: 0   Physical Activity: Not on File (10/6/2022)    Received from [facility]     Physical Activity     Physical Activity: 0   Stress: Not on File (10/6/2022)    Received from [facility]     Stress     Stress: 0   Social Connections: Not on File (10/6/2022)    Received from [facility]     Social Connections     Social Connections and Isolation: 0   Housing Stability: Not on File (10/6/2022)    Received from [facility]     Housing Stability     Housin        PAST MEDICAL HISTORY  History reviewed. No pertinent past medical history.     PAST SURGICAL HISTORY  Past Surgical History:   Procedure Laterality Date    Other Right     ligament surgery on knee    Tubal ligation          MEDICATIONS  * Reviewed   Meloxicam 7.5 MG Oral Tab Take 1 tablet (7.5 mg total) by mouth daily.          ALLERGIES  No Known Allergies     FAMILY HISTORY  History reviewed. No pertinent family history.     REVIEW OF SYSTEMS  A 14 point review of systems was performed. Pertinent positives and negatives noted in the HPI.    PHYSICAL EXAM  Ht 5' 1\" (1.549 m)   Wt 182 lb (82.6 kg)   BMI 34.39 kg/m²      Constitutional: The patient is well-developed, well-nourished, in no acute distress.  Neurological: Alert and oriented to person, place, and time.  Psychiatric: Mood and affect normal.  Head: Normocephalic and atraumatic.  Cardiovascular: regular rate by palpation  Pulmonary/Chest:  Effort normal. No respiratory distress. Breathing non-labored  Abdominal: Abdomen exhibits no distension.   Right  KNEE  INSPECTION/PALPATION  Multiple surgical incisions, well-healed.  No ecchymosis, erythema.  Mild effusion.   No breaks in skin. Skin is euthermic.  Antalgic gait  Diffuse tenderness about both the medial and lateral joint line  ROM  0-120 degrees  KNEE STRENGTH  Flexion: 4+/5  Extension: 4+/5  STABILITY  1B lachman  Equivocal anterior drawer  Pivot shift test deferred  Stable posterior drawer, negative posterior sag and quad active test  Stable to varus and valgus stress at 0 and 30 degrees  Negative prone dial at 30 and 90 degrees  1 quadrant of lateral patellar translation  Negative J sign, negative apprehension  POSITIVE  Barrington, [place]  NEGATIVE  Patellar grind  SILT Gayle/Saph/SPN/DPN/T  2+ DP/PT pulse     RESULTS    Lab Results   Component Value Date    WBC 6.8 06/29/2024    HGB 13.2 06/29/2024    .0 06/29/2024      Lab Results   Component Value Date    GLU 93 06/29/2024    BUN 8 (L) 06/29/2024    CREATSERUM 0.60 06/29/2024        IMAGING  I independently viewed and interpreted the imaging. Radiologist interpretation is available in the imaging report.  X-ray: Plain films of the right knee demonstrate postsurgical changes consistent with multiple ligamentous reconstructions and some mild degenerative changes consistent with her prior surgeries.  No fractures or dislocations appreciated.  No clear evidence of hardware failure    No results found.     ASSESSMENT/PLAN: Ifrah Meza is a 43 year old female who presents to the Orthopaedic surgery clinic today for right knee pain and effusion with instability and mechanical symptoms concerning for recurrent injury of ligamentous repairs and possible meniscal pathology.  We discussed that based on her current effusion in the knee and the multiple ligamentous reconstructions, it is difficult to completely determine whether her repairs  have some laxity to them versus she has a new injury.  We will plan to obtain an MRI of the knee to better clarify the status of her ligaments and meniscus and we will make a determination of next steps based on this.  It is likely she will benefit from physical therapy but we will defer until after we clarify her intra-articular pathology.  Should she have a complete tear of her ligaments, she may ultimately necessitate revision surgery.    Discussed the history, physical exam, treatment to date, and reviewed relevant imaging an studies with the patient.  WEIGHT BEARING STATUS: Weightbearing as tolerated  RANGE-OF-MOTION LIMITATIONS: as tolerated  NEW PRESCRIPTIONS:  We discussed medications for this condition including patient current regimen. Based on this discussion we have added/re-ordered no additional medications  IMAGING ORDERED: MRI right knee  CONSULTS PLACED: We discussed the role of therapy for this condition including previous/ongoing therapy and specialist services. Based on this discussion we have opted not to place a consultation to other specialists/therapy  PROSTHESES/ORTHOTICS: the patient does not need prostheses/orthoses for the current condition  PROCEDURES: none    FOLLOW-UP: after imaging    RADIOGRAPHS AT NEXT VISIT: none    I have personally seen Ifrah Meza and discussed in detail their plan of care. Prior to departure, they indicated agreement with and understanding of their plan of care and their follow-up as documented herein this note. Please note that this note was written in combination with voice recognition/dictation software and there is a possibility of transcription errors which were not identified at the time of note submission. If clarification is necessary, please contact the author or clinic staff.    Chau Ledesma MD  Orthopaedic Surgery  6/30/2024

## 2024-07-08 ENCOUNTER — HOSPITAL ENCOUNTER (OUTPATIENT)
Dept: MRI IMAGING | Facility: HOSPITAL | Age: 44
Discharge: HOME | End: 2024-07-08
Attending: STUDENT IN AN ORGANIZED HEALTH CARE EDUCATION/TRAINING PROGRAM
Payer: MEDICAID

## 2024-07-08 DIAGNOSIS — G89.29 CHRONIC PAIN OF RIGHT KNEE: ICD-10-CM

## 2024-07-08 DIAGNOSIS — M25.561 CHRONIC PAIN OF RIGHT KNEE: ICD-10-CM

## 2024-07-08 PROCEDURE — 73721 MRI JNT OF LWR EXTRE W/O DYE: CPT | Performed by: STUDENT IN AN ORGANIZED HEALTH CARE EDUCATION/TRAINING PROGRAM

## 2024-07-26 ENCOUNTER — OFFICE VISIT (OUTPATIENT)
Dept: ORTHOPEDICS CLINIC | Facility: CLINIC | Age: 44
End: 2024-07-26

## 2024-07-26 VITALS — SYSTOLIC BLOOD PRESSURE: 120 MMHG | HEART RATE: 61 BPM | DIASTOLIC BLOOD PRESSURE: 76 MMHG

## 2024-07-26 DIAGNOSIS — M25.561 CHRONIC PAIN OF RIGHT KNEE: Primary | ICD-10-CM

## 2024-07-26 DIAGNOSIS — G89.29 CHRONIC PAIN OF RIGHT KNEE: Primary | ICD-10-CM

## 2024-07-26 PROCEDURE — 99214 OFFICE O/P EST MOD 30 MIN: CPT | Performed by: STUDENT IN AN ORGANIZED HEALTH CARE EDUCATION/TRAINING PROGRAM

## 2024-07-26 PROCEDURE — 20610 DRAIN/INJ JOINT/BURSA W/O US: CPT | Performed by: STUDENT IN AN ORGANIZED HEALTH CARE EDUCATION/TRAINING PROGRAM

## 2024-07-26 RX ORDER — TRIAMCINOLONE ACETONIDE 40 MG/ML
40 INJECTION, SUSPENSION INTRA-ARTICULAR; INTRAMUSCULAR ONCE
Status: COMPLETED | OUTPATIENT
Start: 2024-07-26 | End: 2024-07-26

## 2024-07-26 RX ADMIN — TRIAMCINOLONE ACETONIDE 40 MG: 40 INJECTION, SUSPENSION INTRA-ARTICULAR; INTRAMUSCULAR at 15:49:00

## 2024-07-26 NOTE — PROGRESS NOTES
Per verbal order from Dr. Ledesma draw up 3ml of 0.5% Marcaine & 2ml 1% lidocaine and 1ml of Kenalog 40 for cortisone injection to right knee. Christina DUNHAM MA    Patient provided education handout for cortisone injection.

## 2024-07-28 NOTE — PROGRESS NOTES
Orthopaedic Surgery Follow-up Patient Visit  _______________________________________________________________________________________________________________  _______________________________________________________________________________________________________________    DATE OF VISIT: 7/27/2024     CHIEF COMPLAINT: Follow-up right knee    Chief Complaint   Patient presents with    Knee Pain     F/u Right knee pain 6/10. Here for MRI results. Meloxicam does not relieved pain.        HISTORY OF PRESENT ILLNESS: Ifrah Meza is a 43 year old female who presents to the clinic for follow-up for her right knee. Since last visit, she obtained MRI of the knee.  She reports that pain continues to be substantial, approximately 6 out of 10 and is not relieved with her current regimen including therapy and anti-inflammatory medications.  Pain continues to be functionally limiting, made worse with activity.  She denies any new injuries.  Pain continues to be primarily lateral in the knee.    SOCIAL HISTORY  Social History     Socioeconomic History    Marital status: Unknown     Spouse name: Not on file    Number of children: Not on file    Years of education: Not on file    Highest education level: Not on file   Occupational History    Not on file   Tobacco Use    Smoking status: Never    Smokeless tobacco: Never   Vaping Use    Vaping status: Never Used   Substance and Sexual Activity    Alcohol use: Yes     Comment: socially     Drug use: No    Sexual activity: Yes     Partners: Male     Birth control/protection: Tubal Ligation   Other Topics Concern    Not on file   Social History Narrative    Not on file     Social Determinants of Health     Financial Resource Strain: Not on File (10/6/2022)    Received from [facility]    Financial Resource Strain     Financial Resource Strain: 0   Food Insecurity: Not on File (10/6/2022)    Received from [facility]    Food Insecurity     Food: 0   Transportation Needs: Not on File  (10/6/2022)    Received from [facility]    Transportation Needs     Transportation: 0   Physical Activity: Not on File (10/6/2022)    Received from [facility]    Physical Activity     Physical Activity: 0   Stress: Not on File (10/6/2022)    Received from [facility]    Stress     Stress: 0   Social Connections: Not on File (10/6/2022)    Received from [facility]    Social Connections     Social Connections and Isolation: 0   Housing Stability: Not on File (10/6/2022)    Received from [facility]    Housing Stability     Housin        PAST MEDICAL HISTORY  History reviewed. No pertinent past medical history.     PAST SURGICAL HISTORY  Past Surgical History:   Procedure Laterality Date    Other Right     ligament surgery on knee    Tubal ligation          MEDICATIONS  Reviewed  No outpatient medications have been marked as taking for the 24 encounter (Office Visit) with Chau Ledesma MD.        ALLERGIES  No Known Allergies     FAMILY HISTORY  History reviewed. No pertinent family history.     REVIEW OF SYSTEMS  A 14 point review of systems was performed. Pertinent positives and negatives noted in the HPI.    PHYSICAL EXAM  /76   Pulse 61      Constitutional: The patient is well-developed, well-nourished, in no acute distress.  Neurological: Alert and oriented to person, place, and time.  Psychiatric: Mood and affect normal.  Head: Normocephalic and atraumatic.  Cardiovascular: regular rate by palpation  Pulmonary/Chest: Effort normal. No respiratory distress. Breathing non-labored  Abdominal: Abdomen exhibits no distension.   Right  KNEE  INSPECTION/PALPATION  No readily apparent visual abnormalities of the knee.  Well-healed surgical incisions  No ecchymosis or erythema  Mild effusion.   No breaks in skin. Skin is euthermic.  Neutral alignment on standing  Antalgic gait  NTTP  to medial joint line, TTP to lateral joint line  ROM  0-130 degrees  KNEE STRENGTH  Flexion: 5/5  Extension:  5/5  STABILITY  1A lachman, stable anterior drawer  Stable posterior drawer  Stable to varus and valgus stress at 0 and 30 degrees  1 quadrant of lateral patellar translation  Negative J sign, negative apprehension  PERTINENT FINDINGS  Positive Barrington    Positive [place]  Negative Patellar grind  SILT Gayle/Saph/SPN/DPN/T  2+ DP/PT pulse, foot wwp     IMAGING  I independently viewed and interpreted the imaging. Radiologist interpretation is available in the imaging report.  MRI: MRI of the right knee demonstrates posterior horn lateral meniscus signal concerning for a meniscal tear.  There is also a lesion within the ACL concerning for a potential cyclops lesion of the ACL.  There are no major injuries to the cruciate or collateral ligaments.  There are no major cartilaginous injuries  MRI KNEE, RIGHT (SFU=[id number])    Result Date: 7/9/2024  PROCEDURE: MRI KNEE, RIGHT (QKR=83884)  COMPARISON: [facility],  KNEE (1 OR 2 VIEWS), RIGHT (CPT=73560), 5/03/2024, 5:02 PM.  INDICATIONS: G89.29 Chronic pain of right knee M25.561 Chronic pain of right knee  TECHNIQUE: A complete multi-planar MRI was performed.   FINDINGS:    MENISCI MEDIAL: Intact  LATERAL: There is a truncated appearance of the posterior body of the lateral meniscus, which demonstrates a wavy morphology (series 5, image 9).  There is fluid signal at this location involving the meniscus at this level with extension to the articular  surface.  LIGAMENTS/TENDONS ANTERIOR CRUCIATE: Changes of prior anterior cruciate ligament reconstruction.  The graft is intact.  There is slight indentation upon the graft by the anterior aspect of the femoral condyle.  Otherwise, the graft is normal in contour and morphology.  There is no exuberant soft tissue associated with the graft to suggest arthrofibrosis or cyclops lesion.. Mild increased signal within the midportion of the graft on T2 weighted imaging, which could be artifactual in nature. POSTERIOR  CRUCIATE: Unremarkable. MEDIAL COLLATERAL: Intact.  No significant edema.  LCL COMPLEX: Intact.  No significant edema.  Thickening of the mid to distal aspect may represent scarring as sequela of prior injury and/or surgical change given the history.  Button along the lateral aspect of the distal femoral metadiaphysis related to previous lateral collateral ligamentous intervention. EXTENSOR MECHANISM: The patellar and quadriceps tendons are intact.  HYALINE CARTILAGE MEDIAL COMPARTMENT:  Diffuse cartilaginous thinning.  No full-thickness cartilage defects are seen. LATERAL COMPARTMENT:  No full-thickness cartilage defects are seen. PATELLOFEMORAL:  Diffuse cartilaginous thinning.  No full-thickness cartilage defects are seen.  BONES: No osseous malalignment.  No acute fracture line.  There postsurgical changes of prior anterior cruciate ligament repair, with susceptibility artifacts related to femoral buttons medially and laterally of the distal femoral metadiaphysis.   There is T1/T2 hypointensity at the lateral aspect of the proximal tibial metadiaphysis, corresponding to sclerosis/surgical change on recent radiographs.  A small adjacent lobulated fluid density is present, which may represent postsurgical cystic change.  MUSCLES: No edema or fatty atrophy is appreciated. EFFUSION: None; no significant synovitis or loose bodies are identified. OTHER: Negative.           CONCLUSION:   Changes of prior anterior cruciate ligament reconstruction, without evidence of disruption.  Slight impression upon the anterior aspect of the graft by the distal femoral condyle, suggesting mild graft impingement (intercondylar roof impingement).  Correlate with symptoms of limited range of motion.  Otherwise, finding can be artifactual, exaggerated by positioning.  Truncated appearance of the posterior body of the lateral meniscus with fluid signal at this location.  Finding could either relate to prior meniscectomy, if there is such  a history.  Otherwise, may represent a mildly complex obliquely oriented tear of the posterior body of the meniscus.  Diffuse cartilaginous thinning in the medial tibiofemoral and patellofemoral compartments, without full-thickness cartilage loss or fissuring.  No acute osseous fracture or dislocation.      Dictated by (CST): Claudine Green MD on 7/09/2024 at 12:49 PM     Finalized by (CST): Claudine Green MD on 7/09/2024 at 1:12 PM            ASSESSMENT/PLAN: Ifrah Meza is a 43 year old female who presents to the Orthopaedic surgery clinic today for follow-up.  Her knee pain is consistent with lateral meniscus pathology and a potential cyclops lesion of the right knee that have not been improving with anti-inflammatories and her current home therapy program.  We discussed other interventions including the possibility of changing anti-inflammatories, continued therapy, potential for injections with continued therapy, and the potential for arthroscopic intervention.  We discussed further that if an injection is helpful for her knee pain, it would be suggestive that, should it return, arthroscopic intervention could be useful as well.  After discussion of risks, benefits, and alternatives, the patient opted for a diagnostic/therapeutic knee injection    We discussed the diagnosis, changes, and therapies performed to date including possible treatments and their associated risks/benefits.  WEIGHT BEARING STATUS: Weightbearing as tolerated  RANGE-OF-MOTION LIMITATIONS: as tolerated  NEW PRESCRIPTIONS:  We discussed medications for this condition including patient current regimen. Based on this discussion we have added/re-ordered no additional medications  IMAGING ORDERED: none  CONSULTS PLACED: We discussed the role of therapy and/or additional specialty evaluation/intervention for this condition including previous/ongoing therapy and specialist services. Based on this discussion we have consulted physical  therapy  PROSTHESES/ORTHOTICS: the patient does not need prostheses/orthoses for the current condition  PROCEDURES: steroid injection right knee    FOLLOW-UP: after trial of therapy if pain persists    RADIOGRAPHS AT NEXT VISIT: none    I have personally seen Ifrah Meza and discussed in detail their plan of care. Prior to departure, they indicated agreement with and understanding of their plan of care and their follow-up as documented herein this note. Please note that this note was written in combination with voice recognition/dictation software and there is a possibility of transcription errors which were not identified at the time of note submission. If clarification is necessary, please contact the author or clinic staff.    Chau Ledesma MD  Orthopaedic Surgery  7/27/2024

## (undated) DEVICE — OCCLUSIVE GAUZE STRIP OVERWRAP,3% BISMUTH TRIBROMOPHENATE IN PETROLATUM BLEND: Brand: XEROFORM

## (undated) DEVICE — KIT ACL TRANS AR-1898S

## (undated) DEVICE — 5.5 CM ACROMIOBLASTER STRAIGHT                                    BURRS, POWER/EP-1, BRICK RED, 8000                                    MAXIMUM RPM, PACKAGED 6 PER BOX, STERILE

## (undated) DEVICE — HYSTEROSCOPIC INFLOW TUBE SET

## (undated) DEVICE — 4.5 MM FULL RADIUS ELITE STRAIGHT                                    DISPOSABLE BLADES, MAROON,PACKAGED 6                                    PER BOX, STERILE

## (undated) DEVICE — SOL  .9 1000ML BTL

## (undated) DEVICE — SOL  .9 3000ML

## (undated) DEVICE — STERILE POLYISOPRENE POWDER-FREE SURGICAL GLOVES: Brand: PROTEXIS

## (undated) DEVICE — ENDOSCOPIC CANNULATED DRILL BIT,                                    4.5 MM, STERILE

## (undated) DEVICE — ADHESIVE MASTISOL 2/3CC VL

## (undated) DEVICE — KENDALL SCD EXPRESS SLEEVES, KNEE LENGTH, MEDIUM: Brand: KENDALL SCD

## (undated) DEVICE — SUTURE TIGERLOOP #2

## (undated) DEVICE — DEVICE FLUID REMOVAL-WATER BUG

## (undated) DEVICE — FIBERWIRE 2.0 NEEDLE AR-7223

## (undated) DEVICE — ZIMMER® STERILE DISPOSABLE TOURNIQUET CUFF WITH PLC, DUAL PORT, SINGLE BLADDER, 34 IN. (86 CM)

## (undated) DEVICE — #15 STERILE STAINLESS BLADE: Brand: STERILE STAINLESS BLADES

## (undated) DEVICE — GOWN SURG AERO CHROME XXL

## (undated) DEVICE — ADAPTER SPIKE DUAL DAVOL 15010

## (undated) DEVICE — GAMMEX® PI HYBRID SIZE 6, STERILE POWDER-FREE SURGICAL GLOVE, POLYISOPRENE AND NEOPRENE BLEND: Brand: GAMMEX

## (undated) DEVICE — HOOK LOCK LATEX FREE ELASTIC BANDAGE 6INX5YD

## (undated) DEVICE — ACL ADD ON PACK-LF: Brand: MEDLINE INDUSTRIES, INC.

## (undated) DEVICE — PADDING CAST COTTON  4

## (undated) DEVICE — GAMMEX® PI HYBRID SIZE 8.5, STERILE POWDER-FREE SURGICAL GLOVE, POLYISOPRENE AND NEOPRENE BLEND: Brand: GAMMEX

## (undated) DEVICE — SUPER TURBOVAC 90 IFS: Brand: COBLATION

## (undated) DEVICE — IMPLANTABLE DEVICE: Type: IMPLANTABLE DEVICE | Status: NON-FUNCTIONAL

## (undated) DEVICE — #10 STERILE BLADE: Brand: POLYMER COATED BLADES

## (undated) DEVICE — OUTFLOW HYSTER S&N

## (undated) DEVICE — GAUZE SPONGES,12 PLY: Brand: CURITY

## (undated) DEVICE — 2000CC GUARDIAN II: Brand: GUARDIAN

## (undated) DEVICE — SUTURE FIBERLOOP #2

## (undated) DEVICE — KNEE ARTHROSCOPY CDS: Brand: MEDLINE INDUSTRIES, INC.

## (undated) DEVICE — DEV REMOVAL TRUCLEAR SFT PLUS

## (undated) DEVICE — 2045 S-DRAPE 2 SURGICAL 10/BX: Brand: STERI-DRAPE™

## (undated) DEVICE — Device

## (undated) DEVICE — DEV REMOVAL TRUCLEAR DNS PLUS

## (undated) DEVICE — POLAR CARE CUBE COOLING UNIT

## (undated) DEVICE — SUTURE MONOCRYL 4-0 PS-2

## (undated) DEVICE — HANDLE LIGHT 71601

## (undated) DEVICE — 3.0 MM INTEGRATED CABLE WAND ICW,                                    SABER 30, 30 DEGREE: Brand: COBLATION

## (undated) DEVICE — CAUTERY PENCIL

## (undated) DEVICE — SUTURE VICRYL 0 CP-1

## (undated) DEVICE — TUBING DW OUTFLOW

## (undated) DEVICE — SUTURE FIBERWIRE AR-7209

## (undated) DEVICE — SUTURE FIBERWIRE 2 AR-7202

## (undated) DEVICE — GAMMEX® NON-LATEX PI ORTHO SIZE 8.5, STERILE POLYISOPRENE POWDER-FREE SURGICAL GLOVE: Brand: GAMMEX

## (undated) DEVICE — REAMER, LOW PROFILE

## (undated) DEVICE — PAD POLAR CARE KNEE/SHOULDER

## (undated) DEVICE — SHEET,DRAPE,70X100,STERILE: Brand: MEDLINE

## (undated) DEVICE — POST OP TSCOPE PREMIER

## (undated) DEVICE — MEDI-VAC NON-CONDUCTIVE SUCTION TUBING: Brand: CARDINAL HEALTH

## (undated) DEVICE — GYN CDS: Brand: MEDLINE INDUSTRIES, INC.

## (undated) DEVICE — HYSTEROSCOPIC INSERT S&N

## (undated) DEVICE — 3M™ STERI-STRIP™ REINFORCED ADHESIVE SKIN CLOSURES, R1547, 1/2 IN X 4 IN (12 MM X 100 MM), 6 STRIPS/ENVELOPE: Brand: 3M™ STERI-STRIP™

## (undated) DEVICE — TUBING IRR 16FT CNT WV 3 ASCP

## (undated) DEVICE — STERILE HOOK LOCK LATEX FREE ELASTIC BANDAGE 6INX5YD: Brand: HOOK LOCK™

## (undated) DEVICE — UNDYED BRAIDED (POLYGLACTIN 910), SYNTHETIC ABSORBABLE SUTURE: Brand: COATED VICRYL

## (undated) DEVICE — SPECIMEN SOCK - STANDARD: Brand: MEDI-VAC

## (undated) NOTE — LETTER
DECLINATION FORM    [address] [facility] provides interpreters for patients who are deaf, hearing impaired, or have Limited [place] Proficiency in order to ensure thee patients an equal opportunity to benefit from services.  There Patient Name: Blanca Calles     : 1980    Page 1 of 1     Printed: @DATE      Medical Record #: [id number]   Revised (03)

## (undated) NOTE — LETTER
AUTHORIZATION FOR SURGICAL OPERATION OR OTHER PROCEDURE    1. I hereby authorize Dr. Ledesma, and [facility] staff assigned to my case to perform the following operation and/or procedure at the [facility] site:    _______________________________________________________________________________________________    Cortisone Injection to Right Knee  _______________________________________________________________________________________________    2.  My physician has explained the nature and purpose of the operation or other procedure, possible alternative methods of treatment, the risks involved, and the possibility of complication to me.  I acknowledge that no guarantee has been made as to the result that may be obtained.  3.  I recognize that, during the course of this operation, or other procedure, unforseen conditions may necessitate additional or different procedure than those listed above.  I, therefore, further authorize and request that the above named physician, his/her physician assistants or designees perform such procedures as are, in his/her professional opinion, necessary and desirable.  4.  Any tissue or organs removed in the operation or other procedure may be disposed of by and at the discretion of the [facility] and [facility].  5.  I understand that in the event of a medical emergency, I will be transported by local paramedics to [facility] or other hospital emergency department.  6.  I certify that I have read and fully understand the above consent to operation and/or other procedure.    7.  I acknowledge that my physician has explained sedation/analgesia administration to me including the risks and benefits.  I consent to the administration of sedation/analgesia as may be necessary or desirable in the judgement of my physician.    Witness signature: ___________________________________________________ Date:  ______/______/_____                     Time:  ________ A.M.  P.M.       Patient Name:  ______________________________________________________  (please print)      Patient signature:  ___________________________________________________             Relationship to Patient:           []  Parent    Responsible person                          []  Spouse  In case of minor or                    [] Other  _____________   Incompetent name:  __________________________________________________                               (please print)      _____________      Responsible person  In case of minor or  Incompetent signature:  _______________________________________________    Statement of Physician  My signature below affirms that prior to the time of the procedure, I have explained to the patient and/or his/her guardian, the risks and benefits involved in the proposed treatment and any reasonable alternative to the proposed treatment.  I have also explained the risks and benefits involved in the refusal of the proposed treatment and have answered the patient's questions.                        Date:  ______/______/_______  Provider                      Signature:  __________________________________________________________       Time:  ___________ A.M    P.M.

## (undated) NOTE — LETTER
Jaclyn Cruz [address]  [place], [address] [facility]    Consent for Operation  Date: __________________                                Time: _______________    1.  I authorize the performance upon Clare Fubing the following operation:  Procedzane medical, scientific, or educational purposes, provided my identity is not revealed by the pictures or by descriptive texts accompanying them. If the procedure has been videotaped, the surgeon will obtain the original videotape.  The hospital will not be res I CERTIFY THAT I HAVE READ AND UNDERSTAND THE ABOVE CONSENT TO OPERATION, THAT MY DOCTOR PROVIDED ME WITH THE ABOVE EXPLANATIONS, THAT ALL BLANKS OR STATEMENTS REQUIRING INSERTION OR COMPLETION WERE FILLED IN.     Signature of Patient:   ___________________ ii. The last time that I ate or drank.  iii. All of the medicines I take (including prescriptions, herbal supplements, and pills I can buy without a prescription (including street drugs/illegal medications).  Failure to inform my anesthesiologist about thes _____________________________________________________________________________  Anesthesiologist Signature     Date   Time  I have discussed the procedure and information above with the patient (or patient’s representative) and answered their questions.  The

## (undated) NOTE — LETTER
Jaclyn Cruz [address] [address]  [place], [address] [facility]    Consent for Operation  Date: __________________                                Time: _______________    1.  I authorize the performance upon George Fallon the following operation:  Procedzane procedure has been videotaped, the surgeon will obtain the original videotape. The hospital will not be responsible for storage or maintenance of this tape.   7. For the purpose of advancing medical education, I consent to the admittance of observers to the STATEMENTS REQUIRING INSERTION OR COMPLETION WERE FILLED IN.     Signature of Patient:   ___________________________    When the patient is a minor or mentally incompetent to give consent:  Signature of person authorized to consent for patient: ____________ supplements, and pills I can buy without a prescription (including street drugs/illegal medications). Failure to inform my anesthesiologist about these medicines may increase my risk of anesthetic complications. iv.  If I am allergic to anything or have ha Anesthesiologist Signature     Date   Time  I have discussed the procedure and information above with the patient (or patient’s representative) and answered their questions. The patient or their representative has agreed to have anesthesia services.     ___